# Patient Record
Sex: FEMALE | Race: AMERICAN INDIAN OR ALASKA NATIVE
[De-identification: names, ages, dates, MRNs, and addresses within clinical notes are randomized per-mention and may not be internally consistent; named-entity substitution may affect disease eponyms.]

---

## 2018-08-30 ENCOUNTER — HOSPITAL ENCOUNTER (EMERGENCY)
Dept: HOSPITAL 5 - ED | Age: 18
Discharge: HOME | End: 2018-08-30
Payer: MEDICAID

## 2018-08-30 VITALS — SYSTOLIC BLOOD PRESSURE: 147 MMHG | DIASTOLIC BLOOD PRESSURE: 90 MMHG

## 2018-08-30 DIAGNOSIS — O21.0: Primary | ICD-10-CM

## 2018-08-30 DIAGNOSIS — Z3A.12: ICD-10-CM

## 2018-08-30 LAB
ALBUMIN SERPL-MCNC: 4.2 G/DL (ref 3.9–5)
ALT SERPL-CCNC: 10 UNITS/L (ref 7–56)
BASOPHILS # (AUTO): 0 K/MM3 (ref 0–0.1)
BASOPHILS NFR BLD AUTO: 0.2 % (ref 0–1.8)
BILIRUB UR QL STRIP: (no result)
BLOOD UR QL VISUAL: (no result)
BUN SERPL-MCNC: 10 MG/DL (ref 7–17)
BUN/CREAT SERPL: 17 %
CALCIUM SERPL-MCNC: 9.4 MG/DL (ref 8.4–10.2)
EOSINOPHIL # BLD AUTO: 0.1 K/MM3 (ref 0–0.4)
EOSINOPHIL NFR BLD AUTO: 0.7 % (ref 0–4.3)
HCT VFR BLD CALC: 38.3 % (ref 30.3–42.9)
HEMOLYSIS INDEX: 2
HGB BLD-MCNC: 13.2 GM/DL (ref 10.1–14.3)
LYMPHOCYTES # BLD AUTO: 1.3 K/MM3 (ref 1.2–5.4)
LYMPHOCYTES NFR BLD AUTO: 12.5 % (ref 13.4–35)
MCH RBC QN AUTO: 28 PG (ref 28–32)
MCHC RBC AUTO-ENTMCNC: 34 % (ref 30–34)
MCV RBC AUTO: 83 FL (ref 79–97)
MONOCYTES # (AUTO): 0.3 K/MM3 (ref 0–0.8)
MONOCYTES % (AUTO): 3.1 % (ref 0–7.3)
MUCOUS THREADS #/AREA URNS HPF: (no result) /HPF
PH UR STRIP: 6 [PH] (ref 5–7)
PLATELET # BLD: 213 K/MM3 (ref 140–440)
RBC # BLD AUTO: 4.65 M/MM3 (ref 3.65–5.03)
RBC #/AREA URNS HPF: 1 /HPF (ref 0–6)
UROBILINOGEN UR-MCNC: < 2 MG/DL (ref ?–2)
WBC #/AREA URNS HPF: 5 /HPF (ref 0–6)

## 2018-08-30 PROCEDURE — 99283 EMERGENCY DEPT VISIT LOW MDM: CPT

## 2018-08-30 PROCEDURE — 96361 HYDRATE IV INFUSION ADD-ON: CPT

## 2018-08-30 PROCEDURE — 80053 COMPREHEN METABOLIC PANEL: CPT

## 2018-08-30 PROCEDURE — 85025 COMPLETE CBC W/AUTO DIFF WBC: CPT

## 2018-08-30 PROCEDURE — 81001 URINALYSIS AUTO W/SCOPE: CPT

## 2018-08-30 PROCEDURE — 84702 CHORIONIC GONADOTROPIN TEST: CPT

## 2018-08-30 PROCEDURE — 36415 COLL VENOUS BLD VENIPUNCTURE: CPT

## 2018-08-30 PROCEDURE — 96374 THER/PROPH/DIAG INJ IV PUSH: CPT

## 2018-08-30 NOTE — EMERGENCY DEPARTMENT REPORT
ED Pregnancy HPI





- General


Chief complaint: Nausea/Vomiting/Diarrhea


Stated complaint: NAUSEA


Time Seen by Provider: 18 16:49


Source: patient


Mode of arrival: Ambulatory


Limitations: No Limitations





- History of Present Illness


Initial comments: 





This is a 18-year-old female  nontoxic, well nourished in appearance, no 

acute signs of distress presents to the ED with c/o of nausea and vomiting.   

Patient stated she is about 12 weeks pregnant and was sent to by [facility].  Patient stated has been taking Zofran with no relief from her 

OBGYN.  Patient stated had a normal ultrasound with OBGYN in the previous 

visits.  Patient denies any vaginal bleeding or discharge.  Patient denies any 

abdominal pain, back pain, chest pain, short of breath, fever, chills, headache

, stiff neck, numbness or tingling.  Patient denies any diarrhea or 

constipation.  Patient denies any recent travels.  Patient denies any drug 

allergies significant past medical history.


MD Complaint: other (nausea vomiting)


Radiation: none


Severity scale (0 -10): 0


Improves with: none


Worsens with: none


Associated symptoms: denies other symptoms, nausea/vomiting.  denies: vaginal 

bleeding, vaginal discharge, abdominal pain, dysuria, headache, vision changes, 

malaise, dysparuenia, rash, seizure, shortness of breath, syncope, weakness


Vaginal bleeding: none


Pregnant:: Yes


Number of weeks pregnant: 12


Pre- care: followed by OB





- Related Data


 Previous Rx's











 Medication  Instructions  Recorded  Last Taken  Type


 


Metoclopramide [Reglan] 10 mg PO TID PRN #30 tab 18 Unknown Rx











 Allergies











Allergy/AdvReac Type Severity Reaction Status Date / Time


 


No Known Allergies Allergy   Verified 18 15:03














ED Review of Systems


ROS: 


Stated complaint: NAUSEA


Other details as noted in HPI





Constitutional: denies: chills, fever


Eyes: denies: eye pain, eye discharge, vision change


ENT: denies: ear pain, throat pain


Respiratory: denies: cough, shortness of breath, wheezing


Cardiovascular: denies: chest pain, palpitations


Endocrine: no symptoms reported


Gastrointestinal: nausea, vomiting.  denies: abdominal pain, diarrhea


Genitourinary: denies: urgency, dysuria, discharge


Musculoskeletal: denies: back pain, joint swelling, arthralgia


Skin: denies: rash, lesions


Neurological: denies: headache, weakness, paresthesias


Psychiatric: denies: anxiety, depression


Hematological/Lymphatic: denies: easy bleeding, easy bruising





ED Past Medical Hx





- Past Medical History


Previous Medical History?: No





- Surgical History


Past Surgical History?: No





- Social History


Smoking Status: Never Smoker


Substance Use Type: None





- Medications


Home Medications: 


 Home Medications











 Medication  Instructions  Recorded  Confirmed  Last Taken  Type


 


Metoclopramide [Reglan] 10 mg PO TID PRN #30 tab 18  Unknown Rx














ED Physical Exam





- General


Limitations: No Limitations


General appearance: alert, in no apparent distress





- Head


Head exam: Present: atraumatic, normocephalic





- Eye


Eye exam: Present: normal appearance


Pupils: Present: normal accommodation





- ENT


ENT exam: Present: normal exam, mucous membranes moist





- Neck


Neck exam: Present: normal inspection, full ROM.  Absent: tenderness, 

meningismus, lymphadenopathy





- Respiratory


Respiratory exam: Present: normal lung sounds bilaterally.  Absent: respiratory 

distress, wheezes, rales, rhonchi, stridor, chest wall tenderness, accessory 

muscle use, decreased breath sounds, prolonged expiratory





- Cardiovascular


Cardiovascular Exam: Present: regular rate, normal rhythm, normal heart sounds.

  Absent: irregular rhythm, systolic murmur, diastolic murmur, rubs, gallop





- GI/Abdominal


GI/Abdominal exam: Present: soft, normal bowel sounds.  Absent: distended, 

tenderness, guarding, rebound, rigid, diminished bowel sounds





- Extremities Exam


Extremities exam: Present: normal inspection, full ROM, normal capillary refill





- Back Exam


Back exam: Present: normal inspection, full ROM





- Neurological Exam


Neurological exam: Present: alert, oriented X3, normal gait





- Psychiatric


Psychiatric exam: Present: normal affect, normal mood





- Skin


Skin exam: Present: warm, dry, intact, normal color.  Absent: rash





ED Course


 Vital Signs











  18





  15:04


 


Temperature 98.7 F


 


Pulse Rate 105 H


 


Respiratory 16





Rate 


 


Blood Pressure 147/90


 


O2 Sat by Pulse 99





Oximetry 














- Reevaluation(s)


Reevaluation #1: 





18 17:53


Patient is speaking in full sentences with no signs of distress noted.





ED Medical Decision Making





- Lab Data


Result diagrams: 


 18 15:50





 18 15:50





- Medical Decision Making





This is a 18-year-old female that presents with hyperemesis .  Patient 

is stable and was examined by me. There is no abdominal tenderness. Negative 

signs of symptoms of appendicitis.  Labs obtained. UA obtained.  Vital signs 

are stable prior to discharge.  Patient received Reglan and 1L Normal saline in 

the ED which patient stated symptoms has resolved and subsided.  A by mouth 

challenge has been obtained and patient tolerated well with no nausea vomiting.

  Patient was notified of strict precautions of appendicitis symptoms and to 

return to the ED if symptoms occurs as soon as possible.  Patient was also 

instructed to Follow-up with a primary care/OBGYN doctor in 3-5 days or if 

symptoms worsen and continue return to emergency room as soon as possible.  At 

time of discharge, the patient does not seem toxic or ill in appearance.  No 

acute signs of distress noted.  Patient agrees to discharge treatment plan of 

care.  No further questions noted by the patient.


Critical care attestation.: 


If time is entered above; I have spent that time in minutes in the direct care 

of this critically ill patient, excluding procedure time.








ED Disposition


Clinical Impression: 


 Hyperemesis gravidarum





Disposition: - TO HOME OR SELFCARE


Is pt being admited?: No


Does the pt Need Aspirin: No


Condition: Stable


Instructions:  Hyperemesis Gravidarum (ED)


Additional Instructions: 


 Follow-up with a primary care/OBGYN doctor in 3-5 days or if symptoms worsen 

and continue return to emergency room as soon as possible.  


Prescriptions: 


Metoclopramide [Reglan] 10 mg PO TID PRN #30 tab


 PRN Reason: Nausea


Referrals: 


PR[facility] CARE,MD [Primary Care Provider] - 3-5 Days


RANDALL ACEVEDO MD [Staff Physician] - 3-5 Days


[facility] [Provider Group] - 3-5 Days


Forms:  Work/School Release Form(ED)

## 2018-09-28 ENCOUNTER — HOSPITAL ENCOUNTER (EMERGENCY)
Dept: HOSPITAL 5 - ED | Age: 18
Discharge: HOME | End: 2018-09-28
Payer: MEDICAID

## 2018-09-28 VITALS — DIASTOLIC BLOOD PRESSURE: 74 MMHG | SYSTOLIC BLOOD PRESSURE: 118 MMHG

## 2018-09-28 DIAGNOSIS — Z3A.16: ICD-10-CM

## 2018-09-28 DIAGNOSIS — O21.0: Primary | ICD-10-CM

## 2018-09-28 LAB
BUN SERPL-MCNC: 9 MG/DL (ref 7–17)
BUN/CREAT SERPL: 45 %
CALCIUM SERPL-MCNC: 9.3 MG/DL (ref 8.4–10.2)
HEMOLYSIS INDEX: 0

## 2018-09-28 PROCEDURE — 96374 THER/PROPH/DIAG INJ IV PUSH: CPT

## 2018-09-28 PROCEDURE — 99283 EMERGENCY DEPT VISIT LOW MDM: CPT

## 2018-09-28 PROCEDURE — 96361 HYDRATE IV INFUSION ADD-ON: CPT

## 2018-09-28 PROCEDURE — 36415 COLL VENOUS BLD VENIPUNCTURE: CPT

## 2018-09-28 PROCEDURE — 80048 BASIC METABOLIC PNL TOTAL CA: CPT

## 2018-09-28 NOTE — EMERGENCY DEPARTMENT REPORT
ED General Adult HPI





- General


Chief complaint: Nausea/Vomiting/Diarrhea


Stated complaint: 16WKS PREGNANT/VOMITING


Time Seen by Provider: 09/28/18 17:15


Source: patient


Mode of arrival: Ambulatory


Limitations: No Limitations





- History of Present Illness


Initial comments: 





Ms. Carrasquillo is a 19 yo female who is 16 weeks pregnant.  This is patient's 

first pregnancy.  She has had severe nausea and vomiting throughout her 

pregnancy.  She has been dependent on antiemetic therapy throughout her 

pregnancy, namely Zofran and Reglan.  She has not had these medications for the 

past 5 days.  Consequently she's had severe nausea and vomiting.  Her 

obstetrician referred her to the ER for IV fluids.  She is followed by [facility].  She denies pain.  She has been unable able to 

tolerate food or liquid over the last day.  MAMTA March 15, 2019


-: Gradual, week(s) (several weeks)


Consistency: constant


Improves with: medication


Worsens with: eating


Associated Symptoms: denies other symptoms


Treatments Prior to Arrival: none





- Related Data


 Previous Rx's











 Medication  Instructions  Recorded  Last Taken  Type


 


Metoclopramide [Reglan] 10 mg PO TID PRN #30 tab 08/30/18 Unknown Rx


 


Metoclopramide [Reglan] 10 mg PO QID PRN #30 tab 09/28/18 Unknown Rx


 


Ondansetron [Zofran Odt] 4 mg PO Q8H PRN #30 tab.rapdis 09/28/18 Unknown Rx











 Allergies











Allergy/AdvReac Type Severity Reaction Status Date / Time


 


No Known Allergies Allergy   Verified 08/30/18 15:03














ED Review of Systems


ROS: 


Stated complaint: 16WKS PREGNANT/VOMITING


Other details as noted in HPI





Comment: All other systems reviewed and negative


Constitutional: denies: fever, malaise


Respiratory: denies: cough


Cardiovascular: denies: chest pain


Gastrointestinal: denies: abdominal pain, diarrhea





ED Past Medical Hx





- Past Medical History


Previous Medical History?: No





- Surgical History


Past Surgical History?: No





- Social History


Smoking Status: Never Smoker


Substance Use Type: None





- Medications


Home Medications: 


 Home Medications











 Medication  Instructions  Recorded  Confirmed  Last Taken  Type


 


Metoclopramide [Reglan] 10 mg PO TID PRN #30 tab 08/30/18  Unknown Rx


 


Metoclopramide [Reglan] 10 mg PO QID PRN #30 tab 09/28/18  Unknown Rx


 


Ondansetron [Zofran Odt] 4 mg PO Q8H PRN #30 tab.rapdis 09/28/18  Unknown Rx














ED Physical Exam





- General


Limitations: No Limitations


General appearance: alert, in no apparent distress





- Head


Head exam: Present: atraumatic, normocephalic





- Eye


Eye exam: Present: normal appearance





- ENT


ENT exam: Present: mucous membranes moist





- Neck


Neck exam: Present: normal inspection.  Absent: tenderness, meningismus





- Respiratory


Respiratory exam: Present: normal lung sounds bilaterally.  Absent: respiratory 

distress, wheezes, rales, rhonchi





- Cardiovascular


Cardiovascular Exam: Present: regular rate, normal rhythm, normal heart sounds.

  Absent: bradycardia, tachycardia, systolic murmur, diastolic murmur, rubs, 

gallop





- GI/Abdominal


GI/Abdominal exam: Present: soft, normal bowel sounds.  Absent: distended, 

tenderness, guarding, rebound





- Extremities Exam


Extremities exam: Present: normal inspection





- Back Exam


Back exam: Present: normal inspection





- Neurological Exam


Neurological exam: Present: alert, oriented X3





- Psychiatric


Psychiatric exam: Present: normal affect, normal mood





- Skin


Skin exam: Present: warm, dry, intact, normal color.  Absent: rash





ED Course


 Vital Signs











  09/28/18





  16:14


 


Temperature 98.9 F


 


Pulse Rate 85


 


Respiratory 18





Rate 


 


Blood Pressure 128/52


 


O2 Sat by Pulse 98





Oximetry 














ED Medical Decision Making





- Lab Data


Result diagrams: 


 09/28/18 17:37








 Laboratory Results - last 24 hr











  09/28/18





  17:37


 


Carbon Dioxide  24


 


BUN  9


 


Creatinine  < 0.2 L


 


Estimated GFR  > 60


 


BUN/Creatinine Ratio  45


 


Glucose  82


 


Calcium  9.3














- Medical Decision Making


Татьяна presents with persistent n/v throughout pregnancy.  Treated with IV fluid 

(two liters NS) in ED.  Rx: zofran and reglan.  





dx: hyperemesis gravidarum


Critical care attestation.: 


If time is entered above; I have spent that time in minutes in the direct care 

of this critically ill patient, excluding procedure time.








ED Disposition


Clinical Impression: 


 Hyperemesis gravidarum





Disposition: DC-01 TO HOME OR SELFCARE


Is pt being admited?: No


Does the pt Need Aspirin: No


Condition: Stable


Instructions:  Hyperemesis Gravidarum (ED)


Prescriptions: 


Metoclopramide [Reglan] 10 mg PO QID PRN #30 tab


 PRN Reason: Nausea And Vomiting


Ondansetron [Zofran Odt] 4 mg PO Q8H PRN #30 tab.rapdis


 PRN Reason: Nausea And Vomiting

## 2018-10-09 ENCOUNTER — HOSPITAL ENCOUNTER (EMERGENCY)
Dept: HOSPITAL 5 - ED | Age: 18
Discharge: HOME | End: 2018-10-09
Payer: COMMERCIAL

## 2018-10-09 VITALS — SYSTOLIC BLOOD PRESSURE: 124 MMHG | DIASTOLIC BLOOD PRESSURE: 93 MMHG

## 2018-10-09 DIAGNOSIS — A08.4: ICD-10-CM

## 2018-10-09 DIAGNOSIS — O99.612: Primary | ICD-10-CM

## 2018-10-09 DIAGNOSIS — Z3A.18: ICD-10-CM

## 2018-10-09 PROCEDURE — 96361 HYDRATE IV INFUSION ADD-ON: CPT

## 2018-10-09 PROCEDURE — 96375 TX/PRO/DX INJ NEW DRUG ADDON: CPT

## 2018-10-09 PROCEDURE — 96374 THER/PROPH/DIAG INJ IV PUSH: CPT

## 2018-10-09 PROCEDURE — 96372 THER/PROPH/DIAG INJ SC/IM: CPT

## 2018-10-09 PROCEDURE — 99282 EMERGENCY DEPT VISIT SF MDM: CPT

## 2018-10-09 NOTE — EMERGENCY DEPARTMENT REPORT
Vomiting/Diarrhea





- HPI


Chief Complaint: Nausea/Vomiting/Diarrhea


Stated Complaint: 18 WEEKS/FEVER/VOMITING


Time Seen by Provider: 10/09/18 11:25


Duration: 2 Days


Severity: moderate


Nausea/Vomiting Severity: Moderate (pt has already had hyperemesis during this 

pregnancy and has not been able to keep anything down for the last 2 days)


Pain Location: Generalized (crampy])


Pain Severity: Mild


Symptoms: Yes Watery Diarrhea, Yes Fever (patient states "it was barely above 

100"), No Bloody diarrhea, No Able to Tolerate Fluids, No Recent Unusual Foods, 

No Recent Untreated Water, No Recent use of Antibiotics, No Family w/ Similar 

Symptoms, No Contacts w/ Similar Symptoms, No Rash, No Hematuria, No Recent URI 

Symptoms





ED Review of Systems


ROS: 


Stated complaint: 18 WEEKS/FEVER/VOMITING


Other details as noted in HPI





Comment: All other systems reviewed and negative





ED Past Medical Hx





- Social History


Smoking Status: Never Smoker


Substance Use Type: None





- Medications


Home Medications: 


 Home Medications











 Medication  Instructions  Recorded  Confirmed  Last Taken  Type


 


Metoclopramide [Reglan] 10 mg PO TID PRN #30 tab 08/30/18  Unknown Rx


 


Metoclopramide [Reglan] 10 mg PO QID PRN #30 tab 09/28/18  Unknown Rx


 


Ondansetron [Zofran Odt] 4 mg PO Q8H PRN #30 tab.rapdis 09/28/18  Unknown Rx


 


Dicyclomine [Bentyl] 10 mg PO QID #15 capsule 10/09/18  Unknown Rx


 


Diphenoxylate/Atropine [Lomotil] 1 tab PO Q4H PRN #10 tablet 10/09/18  Unknown 

Rx


 


Ondansetron [Zofran Odt] 4 mg PO Q8HR PRN #10 tab.rapdis 10/09/18  Unknown Rx


 


Promethazine [Phenergan] 12.5 mg VA Q6H PRN #10 supp.rect 10/09/18  Unknown Rx














Vomiting Diarrhea Exam





- Exam


General: 


Vital signs noted. No distress. Alert and acting appropriately.





HEENT: Yes Moist Mucous Membranes, No Pharyngeal Erythema, No Pharyngeal 

Exudates, No Rhinorrhea, No Conjuctival Injection, No Frontal Tenderness, No 

Maxillary Tenderness


Neck: No Adenopathy, No Rigidity


Lungs: Yes Clear Lung Sounds, Yes Good Air Exchange, No Wheezes, No Stridor, No 

Cough, No Nasal Flaring, No Retractions, No Use of Accessory Muscles


Heart exam: Regular: Yes, Murmur: No, Tachycardia: No


Abdomen: Tenderness: No, Peritoneal Signs: No, Distention: No, Hyperactive 

Bowel sounds: No


Skin exam: Rash: No, Edema: No, Normal turgor: Yes


Neurologic: 


Alert and oriented, no deficits.








Musculoskeletal: 


Unremarkable.











ED Course


 Vital Signs











  10/09/18





  10:29


 


Temperature 99.3 F


 


Pulse Rate 99 H


 


Respiratory 18





Rate 


 


Blood Pressure 124/93


 


O2 Sat by Pulse 97





Oximetry 














ED Medical Decision Making





- Medical Decision Making





Patient appears well and is nontoxic in appearance.  Patient be given IV 

fluids.  Patient is not in the third trimester and can take Lomotil.  Mental be 

given as well and the patient be discharged after IV fluids are given.


Critical care attestation.: 


If time is entered above; I have spent that time in minutes in the direct care 

of this critically ill patient, excluding procedure time.








ED Disposition


Clinical Impression: 


 Viral gastroenteritis





Disposition: DC-01 TO HOME OR SELFCARE


Is pt being admited?: No


Does the pt Need Aspirin: No


Condition: Stable


Instructions:  Gastroenteritis (ED)


Referrals: 


REGINA MINOR MD [Primary Care Provider] - 3-5 Days


Time of Disposition: 11:38